# Patient Record
(demographics unavailable — no encounter records)

---

## 2024-12-07 NOTE — PLAN
[FreeTextEntry1] : Plan: -Patient is eligible for LDCT based on documented information obtained from chart review -Low Dose CT chest for lung cancer screening to be completed  -Patient to follow up with Dr. Evangelista Sanchez

## 2024-12-07 NOTE — HISTORY OF PRESENT ILLNESS
[Former] : Former [TextBox_13] :  Patient is scheduled for a  annual LDCT for lung cancer screening. Shared decision making managed and documented by Dr. Evangelista Sanchez  Multiple attempts to reach the patient were unsuccessful. Chart review performed to confirm eligibility for LDCT.  No documented personal or family history of lung cancer. No documented s/s of lung cancer. Documented PMH: exposure to 9/11 [YearQuit] : 2020 [PacksperYear] : 20

## 2024-12-31 NOTE — PROCEDURE
[FreeTextEntry1] : 21 CT CHEST\par  Since 2020, no change in multiple solid micronodules. No developing suspicious nodules or masses. In view of the provided history of smoking, consideration of enrollment of this patient, as clinically indicated, in a lung cancer screening program may be of value.\par  \par  Cardiomegaly and additional chronic findings as above.\par  *****\par  PFT 21\par  FVC: 1.94 L (67%)--> 1.75 L (61%) \par  FEV1: 1.56 L (70%)--> 1.41 L (63%) \par  FEV1/FVC: 80%--> 81%\par  XBR68-14%: 1.63 L/s (80%)--> 1.49 L/s (73%)\par  TLC: 3.56 L (75%)\par  RV/T%\par  DLCO: 16.18 (80%)\par  Mild restriction\par  mildly reduced diffusion capacity\par  normal diffusion capacity\par  \par  6MWT 21\par  Patient walked 438.9 meters during a 6 minute walk test.\par  Resting O2 saturation was % on RA. Heart rate bpm.\par  End test O2 saturation was 98% on RA. Heart rate 112 bpm. Koby scale end of test "3" moderate\par  This signifies normal walk distance, no desaturation\par  \par  Home sleep study 21\par  Impression: moderate sleep disordered breathing (AHI = 27) with 1 min below 89% saturation. Technically good study with adequate sleep time. Loud snoring. \par  \par  Chest CT 20:\par  Impression: Nonspecific pulmonary nodules right upper lobe and superior segment right lower lobe. 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 Small hiatal hernia

## 2024-12-31 NOTE — PHYSICAL EXAM
[No Acute Distress] : no acute distress [Normal Oropharynx] : normal oropharynx [Normal Appearance] : normal appearance [No JVD] : no jvd [Normal Rate/Rhythm] : normal rate/rhythm [Normal S1, S2] : normal s1, s2 [No Murmurs] : no murmurs [No Resp Distress] : no resp distress [Clear to Auscultation Bilaterally] : clear to auscultation bilaterally [No Abnormalities] : no abnormalities [Benign] : benign [Normal Gait] : normal gait [No Clubbing] : no clubbing [No Cyanosis] : no cyanosis [FROM] : FROM [Normal Color/ Pigmentation] : normal color/ pigmentation [No Focal Deficits] : no focal deficits [Oriented x3] : oriented x3 [Normal Affect] : normal affect [TextBox_11] : NC/AT

## 2024-12-31 NOTE — REASON FOR VISIT
[Follow-Up] : a follow-up visit [Shortness of Breath] : shortness of breath [Pulmonary Nodules] : pulmonary nodules [TextBox_13] : Zena Hanks from [facility]

## 2024-12-31 NOTE — CONSULT LETTER
[Dear  ___] : Dear ~TANNER, [Consult Letter:] : I had the pleasure of evaluating your patient, [unfilled]. [Please see my note below.] : Please see my note below. [Consult Closing:] : Thank you very much for allowing me to participate in the care of this patient.  If you have any questions, please do not hesitate to contact me. [Sincerely,] : Sincerely, [FreeTextEntry2] : Zena Hanks NP\par  [facility]\par  [address]\par  [facility] [address]

## 2024-12-31 NOTE — DISCUSSION/SUMMARY
[FreeTextEntry1] : 3-24-22:\par  Attending's Summary\par  \par  -no pallor, no icterus \par  -no cervical/supraclavicular adenopathy \par  -no JVD at 45 degrees, no hepatojugular reflux \par  -good air entry bilaterally; no wheezing, rhonchi or crackles\par  -good peripheral pulses; normal S1, S2; no murmurs, rubs, or gallops; P2 neither loud nor split \par  -no clinically detected hepatosplenomegaly \par  -no clubbing, no cyanosis, no articular manifestations of rheumatological disease, no skin thickening of dorsum of hands, no Raynaud's, no visible rash \par  -no lower extremity edema bilaterally  \par

## 2024-12-31 NOTE — ASSESSMENT
[FreeTextEntry1] : Labs: None recent  CT chest (12/2024): FINDINGS: AIRWAYS/LUNGS/PLEURA: Patent central airways. Again, few solid micronodules (2-3 mm). Left lung foci of subsegmental atelectasis or parenchymal scaring. No pleural effusion. MEDIASTINUM AND JAY: No lymphadenopathy. VESSELS: Within normal limits. HEART: Heart size is normal. No pericardial effusion. VISUALIZED UPPER ABDOMEN: Sleeve gastrectomy and small hiatal hernia. Cholelithiasis. CHEST WALL AND BONES: No suspicious lesion  IMPRESSION: 1. Since October 13, 2023, unchanged few benign appearing solid micronodules. No new suspicious finding. LungRADS 2 Benign Recommendation: 12 month Low Dose Chest CT is recommended if the person continues to meet eligibility criteria.  PFTs             2021 FEV1/FVC    80% FEV1            1.56, 70% FVC              1.94, 67% TLC               3.56, 75% RV                 1.62, 81% DLCO            16.18, 80%  6MWT 2021: 439 meters (1440 feet); aishwarya SpO2 98%  PSG 2021: AHI 27  A/P: 71 yo F h/o obesity, LETTY, and tobacco use returns to clinic.  Ms. Mckeon is doing well. She is not having respiratory exacerbations. Her chest CT is stable. We discussed the benefits of regular physical activity. She would like to consider pulmonary rehabilitation. She previously benefitted from LABA/LAMA, though her insurance was interrupted. We will try Stiolto (rather than Anoro) and reorder PRN Albuterol.  For her LETTY, we discussed the benefits of CPAP; she declined. She prefers to pursue weight loss. She is amenable to a dental device.  1. Exertional dyspnea 2. Chronic bronchitis 3. h/o Tobacco use (eligible for LCS) 4. Obesity 5 Moderate LETTY  -congratulated on continued smoking cessation -trial Stiolto; PRN Albuterol -recommended regular physical activity; AK referral -update PFTs and 6MWT -repeat chest CT in 12/2025; she is interested in a biomarker trial -Vaccinations: Influenza in 12/2024; obtained pneumococcal vaccinations with her primary care physician -follow-up with me in 3 months

## 2024-12-31 NOTE — HISTORY OF PRESENT ILLNESS
[Former] : former [TextBox_4] : I saw Ms. Ian Mckeon in follow-up today for h/o tobacco use and obesity c/b LETTY. In review, Ms. Mckeon is a 73 yo F h/o DM, GERD, HTN, HLD, tobacco use, and obesity c/b LETTY (declined CPAP). Ms. Mckeon was previously cared for by Dr. Sanchez with last visit in 3/2022.  3-24-22: Doing well. Has lost 15lbs intentionally since last visit with stationary bike use 2 times daily as well as frequent walks (ET from 10 blocks to 15 blocks now). Off all medications, except for Anoro inhaler. No cardiopulmonary symptoms. No complaints.   2024: Ms. Mckeon feels well today. She was unable to establish follow-up in  due to insurance interruption. She has similarly been off the Anoro for 2 years. She felt like it helped significantly. She has maintained her tobacco smoking cessation. She tries to remain active via a stationary bicycle for 15 minutes, 3-4 times/week. She recently started Ozempic. She continues to declined CPAP, though she would consider a dental device.  PMH: DM Obesity s/p bariatric surgery  LETTY Tobacco use  FH: Dad  of Leukemia Mom is alive at age 91  SH: Ms. Mckeon was born in [place]. She is a retired teacher; she worked at [email or web address]. Ms. Mckeon smoked cigarettes at 1/2 pack/day from the ages of 13-70 (28.5 pack-years).

## 2025-02-19 NOTE — REASON FOR VISIT
[Follow-Up] : a follow-up visit [Shortness of Breath] : shortness of breath [Pulmonary Nodules] : pulmonary nodules [TextBox_13] : Zena Hanks from [facility]

## 2025-02-19 NOTE — PHYSICAL EXAM
[No Acute Distress] : no acute distress [TextBox_2] : Unable to obtain due to phone visit; breathing and speaking comfortably over the phone.

## 2025-02-19 NOTE — ASSESSMENT
[FreeTextEntry1] : Labs: None recent  CT chest (12/2024): IMPRESSION: 1. Since October 13, 2023, unchanged few benign appearing solid micronodules. No new suspicious finding. LungRADS 2 Benign Recommendation: 12 month Low Dose Chest CT is recommended if the person continues to meet eligibility criteria.  PFTs            2021 2025 FEV1/FVC   80%              79% FEV1           1.56, 70%     1.11, 52% FVC             1.94, 67%     1.41, 51% TLC              3.56, 75%     2.54, 53% RV                1.62, 81%     1.13, 55% ERV                                   0.18, 30% DLCO           16.18, 80%   13.32, 65% DL/VA                                 123%  6MWT 2021: 439 meters (1440 feet); aishwarya SpO2 98% 2/2025: 228 meters (748 feet); aishwarya SpO2 95% on room air  PSG 2021: AHI 27  A/P: 72 yo F h/o obesity, LETTY, and tobacco use returns to clinic.  Ms. Mckeon's symptoms are stable, and she is not having exacerbations. She could not afford the LABA/LAMA, though she is doing well on PRN RADHA. Her spirometry is lower than prior, and she now has moderate (vs prior mild) restriction. I think she would benefit most from pulmonary rehabilitation and weight loss.   For her LETTY, we discussed the benefits of CPAP; she declined. She prefers to pursue weight loss. She is amenable to a dental device.  Her next LDCT is due in 12/2025.   1. Exertional dyspnea 2. Chronic bronchitis 3. Restrictive lung disease 4. Obesity 5. h/o Tobacco use (eligible for LCS) 6 Moderate LETTY  -congratulated on continued smoking cessation -continue PRN Albuterol -discussed the benefits of low impact physical activity and weight loss; I re-provided the number for pulmonary rehabilitation; she will call to arrange a visit this week. -LDCT in 12/2025 -Vaccinations: Influenza in 12/2024; obtained pneumococcal vaccinations with her primary care physician -follow-up with me in 12/2025

## 2025-02-19 NOTE — HISTORY OF PRESENT ILLNESS
[Former] : former [TextBox_4] : I spoke to Ms. Ian Mckeon in follow-up today for h/o tobacco use and obesity c/b LETTY. She preferred a telephone (vs video) visit today. In review, Ms. Mckeon is a 72 yo F h/o DM, GERD, HTN, HLD, tobacco use, and obesity c/b LETTY (declined CPAP). Ms. Mckeon was previously cared for by Dr. Sanchez with last visit in 3/2022.  3/24/22: Doing well. Has lost 15lbs intentionally since last visit with stationary bike use 2 times daily as well as frequent walks (ET from 10 blocks to 15 blocks now). Off all medications, except for Anoro inhaler. No cardiopulmonary symptoms. No complaints.   2024: Ms. Mckeon feels well today. She was unable to establish follow-up in  due to insurance interruption. She has similarly been off the Anoro for 2 years. She felt like it helped significantly. She has maintained her tobacco smoking cessation. She tries to remain active via a stationary bicycle for 15 minutes, 3-4 times/week. She recently started Ozempic. She continues to declined CPAP, though she would consider a dental device.  2025: Ms. Mckeon is feeling "about the same" today. She wasn't able to afford the Stiolto. She continues using Albuterol 1-2X/day with benefit. She has sustained her smoking cessation. She has not yet initiated pulmonary rehabilitation. She is taking Ozempic, though she has not yet lost weight. She has an upcoming follow-up visit to discuss up-titration.  PMH: DM Obesity s/p bariatric surgery  LETTY Tobacco use  FH: Dad  of Leukemia Mom is alive at age 91  SH: Ms. Mckeon was born in [place]. She is a retired teacher; she worked at [employer]. Ms. Mckeon smoked cigarettes at 1/2 pack/day from the ages of 13-70 (28.5 pack-years).

## 2025-02-19 NOTE — CONSULT LETTER
[Dear  ___] : Dear ~TANNER, [Consult Letter:] : I had the pleasure of evaluating your patient, [unfilled]. [Please see my note below.] : Please see my note below. [Consult Closing:] : Thank you very much for allowing me to participate in the care of this patient.  If you have any questions, please do not hesitate to contact me. [Sincerely,] : Sincerely, [FreeTextEntry2] : Zena Hanks NP\par  [facility]\par  [address]\par  [facility] [address]

## 2025-02-19 NOTE — PROCEDURE
[FreeTextEntry1] : 21 CT CHEST\par  Since 2020, no change in multiple solid micronodules. No developing suspicious nodules or masses. In view of the provided history of smoking, consideration of enrollment of this patient, as clinically indicated, in a lung cancer screening program may be of value.\par  \par  Cardiomegaly and additional chronic findings as above.\par  *****\par  PFT 21\par  FVC: 1.94 L (67%)--> 1.75 L (61%) \par  FEV1: 1.56 L (70%)--> 1.41 L (63%) \par  FEV1/FVC: 80%--> 81%\par  ZPS40-52%: 1.63 L/s (80%)--> 1.49 L/s (73%)\par  TLC: 3.56 L (75%)\par  RV/T%\par  DLCO: 16.18 (80%)\par  Mild restriction\par  mildly reduced diffusion capacity\par  normal diffusion capacity\par  \par  6MWT 21\par  Patient walked 438.9 meters during a 6 minute walk test.\par  Resting O2 saturation was % on RA. Heart rate bpm.\par  End test O2 saturation was 98% on RA. Heart rate 112 bpm. Koby scale end of test "3" moderate\par  This signifies normal walk distance, no desaturation\par  \par  Home sleep study 21\par  Impression: moderate sleep disordered breathing (AHI = 27) with 1 min below 89% saturation. Technically good study with adequate sleep time. Loud snoring. \par  \par  Chest CT 20:\par  Impression: Nonspecific pulmonary nodules right upper lobe and superior segment right lower lobe. 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 Small hiatal hernia